# Patient Record
Sex: MALE | Race: WHITE | NOT HISPANIC OR LATINO | Employment: FULL TIME | URBAN - METROPOLITAN AREA
[De-identification: names, ages, dates, MRNs, and addresses within clinical notes are randomized per-mention and may not be internally consistent; named-entity substitution may affect disease eponyms.]

---

## 2020-01-27 ENCOUNTER — COMMUNICATION - HEALTHEAST (OUTPATIENT)
Dept: FAMILY MEDICINE | Facility: CLINIC | Age: 40
End: 2020-01-27

## 2020-03-11 ENCOUNTER — OFFICE VISIT - HEALTHEAST (OUTPATIENT)
Dept: FAMILY MEDICINE | Facility: CLINIC | Age: 40
End: 2020-03-11

## 2020-03-11 DIAGNOSIS — Z13.6 SCREENING FOR HEART DISEASE: ICD-10-CM

## 2020-03-11 DIAGNOSIS — Z00.00 ROUTINE GENERAL MEDICAL EXAMINATION AT A HEALTH CARE FACILITY: ICD-10-CM

## 2020-03-11 DIAGNOSIS — Z13.1 SCREENING FOR DIABETES MELLITUS: ICD-10-CM

## 2020-03-11 LAB
CHOLEST SERPL-MCNC: 183 MG/DL
FASTING STATUS PATIENT QL REPORTED: YES
FASTING STATUS PATIENT QL REPORTED: YES
GLUCOSE BLD-MCNC: 90 MG/DL (ref 70–125)
HDLC SERPL-MCNC: 50 MG/DL
LDLC SERPL CALC-MCNC: 117 MG/DL
TRIGL SERPL-MCNC: 82 MG/DL

## 2020-03-11 ASSESSMENT — MIFFLIN-ST. JEOR: SCORE: 1820.62

## 2020-03-12 ENCOUNTER — COMMUNICATION - HEALTHEAST (OUTPATIENT)
Dept: FAMILY MEDICINE | Facility: CLINIC | Age: 40
End: 2020-03-12

## 2021-06-04 VITALS
HEIGHT: 74 IN | SYSTOLIC BLOOD PRESSURE: 122 MMHG | WEIGHT: 183.4 LBS | DIASTOLIC BLOOD PRESSURE: 74 MMHG | HEART RATE: 60 BPM | BODY MASS INDEX: 23.54 KG/M2

## 2021-06-05 NOTE — TELEPHONE ENCOUNTER
Please assist in scheduling a physical with .  has an opening on 3/11/2020 at 8:10 for a physical otherwise please assist in scheduling patient next availability. Patient is re-establishing care, please add to appointment note.    Thank you

## 2021-06-06 NOTE — PROGRESS NOTES
ASSESSMENT:  1. Routine general medical examination at a health care facility       2. Screening for heart disease     - Lipid Cascade    3. Screening for diabetes mellitus     - Glucose      Refusing flu shot  He donates blood so he is presuming that he has had HIV testing done at that time so does not need extra HIV testing    PLAN:      Orders Placed This Encounter   Procedures     Tdap vaccine greater than or equal to 8yo IM     Lipid Cascade     Order Specific Question:   Fasting is required?     Answer:   Yes     Glucose     Medications Discontinued During This Encounter   Medication Reason     pseudoephedrine (SUDAFED) 120 mg 12 hr tablet Therapy completed       No follow-ups on file.    Health Maintenance Due   Topic Date Due     PREVENTIVE CARE VISIT  1981     HIV SCREENING  10/01/1996     ADVANCE CARE PLANNING  10/01/1999     LIPID  10/01/2016     TD 18+ HE  01/11/2018     INFLUENZA VACCINE RULE BASED (1) 08/01/2019         CHIEF COMPLAINT:  Chief Complaint   Patient presents with     Establish Care         HISTORY OF PRESENT ILLNESS:  Ulices Lewis is a 39 y.o. male presenting to the clinic today for a physical exam.     He donates blood cells pretty sure he has had HIV testing in accordance with that.  He is not inclined to get an influenza vaccine.    He is fasting so we can do fasting lipid panel will do a blood sugar.  He has no concerns about vitamin D.    I reviewed family history and its fairly unremarkable.  He does not know the cause of death for his grandparents.  His mother had a stroke in her 70s and he is not quite sure what else might have caused that but she does have elevated lipids.    His review of systems is negative.    Healthy Habits  Are you taking a daily aspirin? No  Do you typically exercising at least 40 min, 3-4 times per week?  NO  Do you usually eat at least 4 servings of fruit and vegetables a day, include whole grains and fiber and avoid regularly eating high fat  foods? NO  Have you had an eye exam in the past two years? NO  Do you see a dentist twice per year? Yes  Do you have any concerns regarding sleep? No  Do you drink caffeine? YES    Safety Screen  If you own firearms, are they secured in a locked gun cabinet or with trigger locks? The patient does not own any firearms    REVIEW OF SYSTEMS:   All other systems are negative.    PFSH:     Social History     Tobacco Use   Smoking Status Never Smoker   Smokeless Tobacco Never Used     Family History   Problem Relation Age of Onset     Stroke Mother 70     Hyperlipidemia Mother      No Medical Problems Father      Social History     Socioeconomic History     Marital status:      Spouse name: Not on file     Number of children: Not on file     Years of education: Not on file     Highest education level: Not on file   Occupational History     Not on file   Social Needs     Financial resource strain: Not on file     Food insecurity     Worry: Not on file     Inability: Not on file     Transportation needs     Medical: Not on file     Non-medical: Not on file   Tobacco Use     Smoking status: Never Smoker     Smokeless tobacco: Never Used   Substance and Sexual Activity     Alcohol use: Not on file     Drug use: Not on file     Sexual activity: Not on file   Lifestyle     Physical activity     Days per week: Not on file     Minutes per session: Not on file     Stress: Not on file   Relationships     Social connections     Talks on phone: Not on file     Gets together: Not on file     Attends Faith service: Not on file     Active member of club or organization: Not on file     Attends meetings of clubs or organizations: Not on file     Relationship status: Not on file     Intimate partner violence     Fear of current or ex partner: Not on file     Emotionally abused: Not on file     Physically abused: Not on file     Forced sexual activity: Not on file   Other Topics Concern     Not on file   Social History Narrative  "    Not on file     Past Surgical History:   Procedure Laterality Date     WISDOM TOOTH EXTRACTION       Allergies   Allergen Reactions     Sulfa (Sulfonamide Antibiotics)      Active Ambulatory Problems     Diagnosis Date Noted     No Active Ambulatory Problems     Resolved Ambulatory Problems     Diagnosis Date Noted     Scrotum Disorders      No Additional Past Medical History       VITALS:  Vitals:    03/11/20 0756   BP: 122/74   Pulse: 60   Weight: 183 lb 6.4 oz (83.2 kg)   Height: 6' 2.25\" (1.886 m)     BP Readings from Last 3 Encounters:   03/11/20 122/74   12/22/16 120/60   10/28/15 122/80     Wt Readings from Last 3 Encounters:   03/11/20 183 lb 6.4 oz (83.2 kg)   12/22/16 175 lb 1.6 oz (79.4 kg)   10/28/15 177 lb 12.8 oz (80.6 kg)     Body mass index is 23.39 kg/m .    PHYSICAL EXAM:  General Appearance: Alert, cooperative, no distress, appears stated age  Head: Normocephalic, without obvious abnormality, atraumatic  Eyes: PERRL, conjunctiva/corneas clear, EOM's intact. Glasses   Ears: Normal TM's and external ear canals, both ears  Nose: Nares normal, septum midline,mucosa normal, no drainage  Throat: Lips, mucosa, and tongue normal; teeth and gums normal  Neck: Supple, symmetrical, trachea midline, no adenopathy;  thyroid: not enlarged, symmetric, no tenderness/mass/nodules;    Back: Symmetric, no curvature, ROM normal, no CVA tenderness  Lungs: Clear to auscultation bilaterally, respirations unlabored  Heart: Regular rate and rhythm, S1 and S2 normal, no murmur, rub, or gallop, Abdomen: Soft, non-tender, bowel sounds active all four quadrants,  no masses, no organomegaly  Extremities: Extremities normal, atraumatic, no cyanosis or edema  Skin: Skin color, texture, turgor normal, no rashes or lesions  Lymph nodes: Cervical, supraclavicular, and axillary nodes normal  Neurologic: Normal    MEDICATIONS:  Current Outpatient Medications   Medication Sig Dispense Refill     fexofenadine (ALLEGRA) 30 MG tablet " Take 30 mg by mouth 2 (two) times a day.       multivitamin therapeutic (THERAGRAN) tablet Take 1 tablet by mouth daily.       ibuprofen (ADVIL,MOTRIN) 200 MG tablet Take 3 tablets (600 mg total) by mouth every 6 (six) hours as needed for pain. 30 tablet 0     No current facility-administered medications for this visit.

## 2021-06-20 NOTE — LETTER
Letter by Martha Alejandre MD at      Author: Martha Alejandre MD Service: -- Author Type: --    Filed:  Encounter Date: 3/12/2020 Status: (Other)         Ulices Lewis  [address] [facility] [address]             March 12, 2020         Dear Mr. Lewis,    Below are the results from your recent visit:    Resulted Orders   Lipid Cascade   Result Value Ref Range    Cholesterol 183 <=199 mg/dL    Triglycerides 82 <=149 mg/dL    HDL Cholesterol 50 >=40 mg/dL    LDL Calculated 117 <=129 mg/dL    Patient Fasting > 8hrs? Yes    Glucose   Result Value Ref Range    Glucose 90 70 - 125 mg/dL    Patient Fasting > 8hrs? Yes     Narrative    Fasting Glucose reference range is 70-99 mg/dL per  American Diabetes Association (ADA) guidelines.       Things look great!    Please call with questions or contact us using [employer].    Sincerely,        Electronically signed by Martha Alejandre MD

## 2023-01-10 ENCOUNTER — HOSPITAL ENCOUNTER (EMERGENCY)
Facility: CLINIC | Age: 43
Discharge: HOME | End: 2023-01-10
Attending: EMERGENCY MEDICINE | Admitting: EMERGENCY MEDICINE
Payer: COMMERCIAL

## 2023-01-10 ENCOUNTER — APPOINTMENT (OUTPATIENT)
Dept: CT IMAGING | Facility: CLINIC | Age: 43
End: 2023-01-10
Attending: EMERGENCY MEDICINE
Payer: COMMERCIAL

## 2023-01-10 VITALS
SYSTOLIC BLOOD PRESSURE: 125 MMHG | WEIGHT: 185 LBS | DIASTOLIC BLOOD PRESSURE: 75 MMHG | RESPIRATION RATE: 18 BRPM | OXYGEN SATURATION: 99 % | HEART RATE: 71 BPM | TEMPERATURE: 98.1 F | BODY MASS INDEX: 23.59 KG/M2

## 2023-01-10 DIAGNOSIS — N20.0 KIDNEY STONE: ICD-10-CM

## 2023-01-10 LAB
ALBUMIN UR-MCNC: 20 MG/DL
ANION GAP SERPL CALCULATED.3IONS-SCNC: 12 MMOL/L (ref 7–15)
APPEARANCE UR: CLEAR
BASOPHILS # BLD AUTO: 0 10E3/UL (ref 0–0.2)
BASOPHILS NFR BLD AUTO: 0 %
BILIRUB UR QL STRIP: NEGATIVE
BUN SERPL-MCNC: 21.2 MG/DL (ref 6–20)
CALCIUM SERPL-MCNC: 9.4 MG/DL (ref 8.6–10)
CHLORIDE SERPL-SCNC: 101 MMOL/L (ref 98–107)
COLOR UR AUTO: YELLOW
CREAT SERPL-MCNC: 1.18 MG/DL (ref 0.67–1.17)
DEPRECATED HCO3 PLAS-SCNC: 27 MMOL/L (ref 22–29)
EOSINOPHIL # BLD AUTO: 0.1 10E3/UL (ref 0–0.7)
EOSINOPHIL NFR BLD AUTO: 2 %
ERYTHROCYTE [DISTWIDTH] IN BLOOD BY AUTOMATED COUNT: 12.7 % (ref 10–15)
GFR SERPL CREATININE-BSD FRML MDRD: 80 ML/MIN/1.73M2
GLUCOSE SERPL-MCNC: 135 MG/DL (ref 70–99)
GLUCOSE UR STRIP-MCNC: NEGATIVE MG/DL
HCT VFR BLD AUTO: 42.9 % (ref 40–53)
HGB BLD-MCNC: 14.2 G/DL (ref 13.3–17.7)
HGB UR QL STRIP: ABNORMAL
HOLD SPECIMEN: NORMAL
HOLD SPECIMEN: NORMAL
HYALINE CASTS: 1 /LPF
IMM GRANULOCYTES # BLD: 0 10E3/UL
IMM GRANULOCYTES NFR BLD: 0 %
KETONES UR STRIP-MCNC: NEGATIVE MG/DL
LEUKOCYTE ESTERASE UR QL STRIP: NEGATIVE
LYMPHOCYTES # BLD AUTO: 3.4 10E3/UL (ref 0.8–5.3)
LYMPHOCYTES NFR BLD AUTO: 44 %
MCH RBC QN AUTO: 30.4 PG (ref 26.5–33)
MCHC RBC AUTO-ENTMCNC: 33.1 G/DL (ref 31.5–36.5)
MCV RBC AUTO: 92 FL (ref 78–100)
MONOCYTES # BLD AUTO: 0.7 10E3/UL (ref 0–1.3)
MONOCYTES NFR BLD AUTO: 9 %
MUCOUS THREADS #/AREA URNS LPF: PRESENT /LPF
NEUTROPHILS # BLD AUTO: 3.5 10E3/UL (ref 1.6–8.3)
NEUTROPHILS NFR BLD AUTO: 45 %
NITRATE UR QL: NEGATIVE
NRBC # BLD AUTO: 0 10E3/UL
NRBC BLD AUTO-RTO: 0 /100
PH UR STRIP: 6 [PH] (ref 5–7)
PLATELET # BLD AUTO: 225 10E3/UL (ref 150–450)
POTASSIUM SERPL-SCNC: 4.3 MMOL/L (ref 3.4–5.3)
RBC # BLD AUTO: 4.67 10E6/UL (ref 4.4–5.9)
RBC URINE: 168 /HPF
SODIUM SERPL-SCNC: 140 MMOL/L (ref 136–145)
SP GR UR STRIP: 1.03 (ref 1–1.03)
UROBILINOGEN UR STRIP-MCNC: NORMAL MG/DL
WBC # BLD AUTO: 7.7 10E3/UL (ref 4–11)
WBC URINE: 5 /HPF

## 2023-01-10 PROCEDURE — 258N000003 HC RX IP 258 OP 636: Performed by: EMERGENCY MEDICINE

## 2023-01-10 PROCEDURE — 81001 URINALYSIS AUTO W/SCOPE: CPT | Performed by: EMERGENCY MEDICINE

## 2023-01-10 PROCEDURE — 96374 THER/PROPH/DIAG INJ IV PUSH: CPT

## 2023-01-10 PROCEDURE — 36415 COLL VENOUS BLD VENIPUNCTURE: CPT | Performed by: EMERGENCY MEDICINE

## 2023-01-10 PROCEDURE — 96361 HYDRATE IV INFUSION ADD-ON: CPT

## 2023-01-10 PROCEDURE — 250N000011 HC RX IP 250 OP 636: Performed by: EMERGENCY MEDICINE

## 2023-01-10 PROCEDURE — 99285 EMERGENCY DEPT VISIT HI MDM: CPT | Mod: 25

## 2023-01-10 PROCEDURE — 80048 BASIC METABOLIC PNL TOTAL CA: CPT | Performed by: EMERGENCY MEDICINE

## 2023-01-10 PROCEDURE — 85025 COMPLETE CBC W/AUTO DIFF WBC: CPT | Performed by: EMERGENCY MEDICINE

## 2023-01-10 PROCEDURE — 74176 CT ABD & PELVIS W/O CONTRAST: CPT

## 2023-01-10 RX ORDER — KETOROLAC TROMETHAMINE 15 MG/ML
15 INJECTION, SOLUTION INTRAMUSCULAR; INTRAVENOUS ONCE
Status: COMPLETED | OUTPATIENT
Start: 2023-01-10 | End: 2023-01-10

## 2023-01-10 RX ORDER — KETOROLAC TROMETHAMINE 10 MG/1
10 TABLET, FILM COATED ORAL EVERY 6 HOURS PRN
Qty: 20 TABLET | Refills: 0 | Status: SHIPPED | OUTPATIENT
Start: 2023-01-10 | End: 2023-01-22

## 2023-01-10 RX ORDER — ONDANSETRON 4 MG/1
4 TABLET, ORALLY DISINTEGRATING ORAL EVERY 8 HOURS PRN
Qty: 12 TABLET | Refills: 0 | Status: SHIPPED | OUTPATIENT
Start: 2023-01-10 | End: 2023-01-13

## 2023-01-10 RX ADMIN — SODIUM CHLORIDE 1000 ML: 9 INJECTION, SOLUTION INTRAVENOUS at 08:18

## 2023-01-10 RX ADMIN — KETOROLAC TROMETHAMINE 15 MG: 15 INJECTION, SOLUTION INTRAMUSCULAR; INTRAVENOUS at 07:31

## 2023-01-10 ASSESSMENT — ENCOUNTER SYMPTOMS
FREQUENCY: 0
DYSURIA: 0
NUMBNESS: 1
NAUSEA: 1
DIARRHEA: 0
FEVER: 0
COUGH: 0
LIGHT-HEADEDNESS: 1
VOMITING: 1
HEMATURIA: 0
FLANK PAIN: 1
DIFFICULTY URINATING: 0
CHILLS: 0

## 2023-01-10 ASSESSMENT — ACTIVITIES OF DAILY LIVING (ADL): ADLS_ACUITY_SCORE: 35

## 2023-01-10 NOTE — ED TRIAGE NOTES
A&O x4, ABCs intact. Pt presents with left sided flank pain that came on suddenly about 30 minutes ago. Pt states that he got nauseous. He reports the pain comes in waves.        Triage Assessment     Row Name 01/10/23 0728       Triage Assessment (Adult)    Airway WDL WDL       Respiratory WDL    Respiratory WDL WDL       Cardiac WDL    Cardiac WDL WDL

## 2023-01-10 NOTE — ED PROVIDER NOTES
"    History     Chief Complaint:  Flank Pain       HPI   Ulices Lewis is a 42 year old male who presents with left-sided flank pain beginning this morning. Patient says he was walking into the kitchen to make breakfast when he suddenly experienced severe flank pain which made him double over. He then became nauseous and lightheaded from the pain and had several episodes of dry heaving. The pain waxes and wanes in severity. He describes the pain as feeling like he is \"being impaled.\" Ulices says he has never experienced pain like this before. He also endorses having some numbness in his fingers and toes shortly after the onset of his pain. No recent urinary symptoms, diarrhea, or cold symptoms. He was feeling well before bad last night and slept okay during the night. He has not had any prior abdominal surgeries. His father had kidney stones and Ulices notes that his symptoms are very similar to the symptoms experienced by his father with a stone. He currently feels improved after receiving Toradol upon arrival to the ED.    Independent Historian: Patient     Review of External Notes: I reviewed patient's Care Everywhere.      ROS:  Review of Systems   Constitutional: Negative for chills and fever.   Respiratory: Negative for cough.    Gastrointestinal: Positive for nausea (since resolved) and vomiting (dry heaving). Negative for diarrhea.   Genitourinary: Positive for flank pain (left-sided, since resolved). Negative for decreased urine volume, difficulty urinating, dysuria, frequency, hematuria and urgency.   Neurological: Positive for light-headedness (since resolved) and numbness.   All other systems reviewed and are negative.    Allergies:  Sulfa Drugs     Medications:    Allegra     Past Medical History:    Seasonal allergies     Past Surgical History:    [place] tooth extraction     Family History:    Mother: Stroke, hyperlipidemia     Social History:  Patient reports that he has never smoked. He has never used " smokeless tobacco.  Arrives with a family member via private vehicle  PCP: Martha Alejandre MD, Family Medicine     Physical Exam     Patient Vitals for the past 24 hrs:   BP Temp Temp src Pulse Resp SpO2 Weight   01/10/23 0729 131/61 98.1  F (36.7  C) Temporal 80 20 100 % 83.9 kg (185 lb)        Physical Exam  Constitutional: Vital signs reviewed.  Pleasant.  HEENT: Moist mucous membranes  Cardiovascular: Regular rate and rhythm  Pulmonary/Chest: Breathing comfortably on room air.  No audible wheezing  Abdomen: Mild left sided tenderness without guarding. No flank tenderness.   Musculoskeletal/Extremities: No bony deformities.  Moves all 4 extremities all difficulty.  Neurological: Alert.  No focal deficits.  Endo: No pitting edema  Skin: No visible rash.  Psychiatric: Pleasant.    Emergency Department Course     Imaging:  Abd/pelvis CT no contrast - Stone Protocol   Preliminary Result   IMPRESSION:    1.  0.3 cm left ureterovesical junction stone. No hydronephrosis but   there is minimal left renal edema.   2.  Small nonobstructing stone within the right kidney.         Report per radiology    Laboratory:  Labs Ordered and Resulted from Time of ED Arrival to Time of ED Departure   BASIC METABOLIC PANEL - Abnormal       Result Value    Sodium 140      Potassium 4.3      Chloride 101      Carbon Dioxide (CO2) 27      Anion Gap 12      Urea Nitrogen 21.2 (*)     Creatinine 1.18 (*)     Calcium 9.4      Glucose 135 (*)     GFR Estimate 80     UA MACROSCOPIC WITH REFLEX TO MICRO AND CULTURE - Abnormal    Color Urine Yellow      Appearance Urine Clear      Glucose Urine Negative      Bilirubin Urine Negative      Ketones Urine Negative      Specific Gravity Urine 1.027      Blood Urine Large (*)     pH Urine 6.0      Protein Albumin Urine 20 (*)     Urobilinogen Urine Normal      Nitrite Urine Negative      Leukocyte Esterase Urine Negative      Mucus Urine Present (*)     RBC Urine 168 (*)     WBC Urine 5      Hyaline  Casts Urine 1     CBC WITH PLATELETS AND DIFFERENTIAL    WBC Count 7.7      RBC Count 4.67      Hemoglobin 14.2      Hematocrit 42.9      MCV 92      MCH 30.4      MCHC 33.1      RDW 12.7      Platelet Count 225      % Neutrophils 45      % Lymphocytes 44      % Monocytes 9      % Eosinophils 2      % Basophils 0      % Immature Granulocytes 0      NRBCs per 100 WBC 0      Absolute Neutrophils 3.5      Absolute Lymphocytes 3.4      Absolute Monocytes 0.7      Absolute Eosinophils 0.1      Absolute Basophils 0.0      Absolute Immature Granulocytes 0.0      Absolute NRBCs 0.0          Emergency Department Course & Assessments:       Interventions/Assessments:  0731 Toradol  15 mg  IVC  0814 I obtained history and examined the patient as noted above.   0818 NS  1L  IV      Independent Interpretation (X-rays, CTs, rhythm strip):  I independently interpreted the patient's imaging.       Social Determinants of Health affecting care:  None       Disposition:  The patient was discharged to home.     Impression & Plan      Medical Decision Making:    Ulices Lewis is a 42 year old male who presents with left-sided flank pain, nausea, and lightheadedness. A broad differential diagnosis was considered including diverticulitis, ureterolithiasis, tumor, colitis, cholecystitis, aneurysm, dissection, volvulus, appendicitis, splenic issue, stomach pathology, ulcer, hydronephrosis, pneumonia, rib fracture, UTI, pyelonephritis amongst many other etiologies.   Signs and symptoms consistent with ureterolithiasis.  Patients pain is controlled in ED and given Zofran and Toradol.  No signs of infected stone.  Patient is hemodynamically stable in ED. Plan is home with abdominal pain recheck by primary care physician or return to ED if symptoms worsen.  Return for fevers greater than 102, increasing pain, other new symptoms develop.  Ureterolithiasis precautions for home.  Questions were answered.       Diagnosis:    ICD-10-CM    1. Kidney  stone  N20.0            Discharge Medications:  New Prescriptions    KETOROLAC (TORADOL) 10 MG TABLET    Take 1 tablet (10 mg) by mouth every 6 hours as needed for moderate pain (4-6)    ONDANSETRON (ZOFRAN ODT) 4 MG ODT TAB    Take 1 tablet (4 mg) by mouth every 8 hours as needed for nausea        Scribe Disclosure:  I, Geena Jones, am serving as a scribe at 8:15 AM on 1/10/2023 to document services personally performed by Joesph Faith MD based on my observations and the provider's statements to me.     1/10/2023   Joesph Faith MD Walters, Brent Aaron, MD  01/10/23 1002

## 2024-12-17 ENCOUNTER — OFFICE VISIT (OUTPATIENT)
Dept: FAMILY MEDICINE | Facility: CLINIC | Age: 44
End: 2024-12-17
Payer: COMMERCIAL

## 2024-12-17 VITALS
BODY MASS INDEX: 25.67 KG/M2 | HEIGHT: 74 IN | SYSTOLIC BLOOD PRESSURE: 129 MMHG | WEIGHT: 200 LBS | RESPIRATION RATE: 16 BRPM | HEART RATE: 71 BPM | OXYGEN SATURATION: 99 % | TEMPERATURE: 98.4 F | DIASTOLIC BLOOD PRESSURE: 86 MMHG

## 2024-12-17 DIAGNOSIS — M25.531 PAIN IN BOTH WRISTS: Primary | ICD-10-CM

## 2024-12-17 DIAGNOSIS — M25.532 PAIN IN BOTH WRISTS: Primary | ICD-10-CM

## 2024-12-17 PROCEDURE — 99204 OFFICE O/P NEW MOD 45 MIN: CPT | Performed by: PHYSICIAN ASSISTANT

## 2024-12-17 ASSESSMENT — PAIN SCALES - GENERAL: PAINLEVEL_OUTOF10: NO PAIN (0)

## 2024-12-17 NOTE — PATIENT INSTRUCTIONS
Try taking ibuprofen 600 mg 3 times daily with meals for 1-2 weeks  Can try stretching exercises  Consider physical therapy or follow-up with ortho if no improvement or if worsening

## 2024-12-17 NOTE — PROGRESS NOTES
Assessment & Plan     Pain in both wrists  Primarily pain in left wrist over the last 1-2 months. More recently noticing pain in right wrist as well which may be related to overuse as he has been using right hand/wrist more due to left wrist pain. No red flag symptoms. Overall unremarkable exam today. Discussed imaging unlikely to be helpful at this time. Consider nerve entrapment such as carpal tunnel, tendinitis, osteoarthritis, among others. No other signs/symptoms of autoimmune or rheumatologic condition at this time. Will treat for possible tendinitis with NSIAID. Recommend wrist brace. Briefly reviewed wrist exercises he could try but encourage physical therapy. Monitor and follow-up if no improvement in next 1-2 weeks, earlier if worsening.  - Occupational Therapy  Referral; Future    He declines vaccines today    45 minutes spent on the date of the encounter doing chart review, history and exam, documentation and further activities per the note      Subjective   Ulices is a 44 year old, presenting for the following health issues:  Musculoskeletal Problem        12/17/2024     3:08 PM   Additional Questions   Roomed by JOSE DE JESUS HERRERA   Accompanied by SELF         12/17/2024     3:08 PM   Patient Reported Additional Medications   Patient reports taking the following new medications NA     History of Present Illness       Reason for visit:  Wrist injury  Symptom onset:  3-4 weeks ago  Symptoms include:  Pain lifting or twisting wrist  Symptom intensity:  Moderate  Symptom progression:  Improving  Had these symptoms before:  No   He is taking medications regularly.     New patient to me/clinic today  Overall denies significant medical problems  Takes allergy medication occasionally but no regular medications    Primarily pain in left wrist over the last 1-2 months. More recently noticing pain in right wrist as well which may be related to overuse as he has been using right hand/wrist more due to left wrist  "pain.     Left wrist pain first started after putting away Halloween decorations  Felt better after 1 week    For the last year he has done push ups every other day  When he had pain initially, he stopped push ups but tried again after about 1 week when wrist felt better  Pain in left wrist returned after doing push ups and was worse than initial pain  He rested again and after about 1.5 weeks, felt better again  Then he put up [place] decorations and pain returned again    Started using right wrist a lot more but now right wrist starting to hurt  No injury or trauma, no history of similar pain. No joint redness/swelling/warmth. Pain is mostly on volar surface of wrists but no consistent location - pain sometimes on ulnar side of wrist, sometimes radial side of wrist, sometimes in the center of the wrist. Pain brought on by twisting movements. He says he had a little tingling in palms briefly once about a week ago but otherwise no numbness/tingling in hands/fingers. Hands/wrists have felt weak when pain is bad, otherwise no weakness. No neck pain. No other muscle/joint pain.    He works at [employer], computer work  Right handed        Objective    /86   Pulse 71   Temp 98.4  F (36.9  C) (Oral)   Resp 16   Ht 1.886 m (6' 2.25\")   Wt 90.7 kg (200 lb)   SpO2 99%   BMI 25.51 kg/m    Body mass index is 25.51 kg/m .  Physical Exam   GENERAL: alert and no distress  RESP: lungs clear to auscultation - no rales, rhonchi or wheezes  CV: regular rate and rhythm, normal S1 S2, no S3 or S4, no murmur, click or rub  Wrists appear normal without swelling, redness, warmth, or obvious deformity. Full ROM wrists and fingers. Negative Finkelstein's test bilaterally. Negative Phalen/Tinel tests. Normal radial pulses.  strength normal. Normal radial pulses, cap refill intact.  NEURO: Normal strength and tone, mentation intact and speech normal  PSYCH: mentation appears normal, affect normal/bright          Signed " Electronically by: Kayy Lee PA-C